# Patient Record
Sex: FEMALE | Race: WHITE | NOT HISPANIC OR LATINO | Employment: OTHER | URBAN - METROPOLITAN AREA
[De-identification: names, ages, dates, MRNs, and addresses within clinical notes are randomized per-mention and may not be internally consistent; named-entity substitution may affect disease eponyms.]

---

## 2023-10-02 PROBLEM — Z91.09 ENVIRONMENTAL ALLERGIES: Status: ACTIVE | Noted: 2023-10-02

## 2023-10-02 PROBLEM — J44.9 COPD (CHRONIC OBSTRUCTIVE PULMONARY DISEASE) (MULTI): Status: ACTIVE | Noted: 2023-10-02

## 2023-10-02 RX ORDER — BUSPIRONE HYDROCHLORIDE 5 MG/1
5 TABLET ORAL DAILY
COMMUNITY

## 2023-10-02 RX ORDER — ALBUTEROL SULFATE 90 UG/1
1-2 AEROSOL, METERED RESPIRATORY (INHALATION) AS NEEDED
COMMUNITY

## 2023-10-02 RX ORDER — TIOTROPIUM BROMIDE AND OLODATEROL 3.124; 2.736 UG/1; UG/1
2 SPRAY, METERED RESPIRATORY (INHALATION) DAILY
COMMUNITY
End: 2024-04-16 | Stop reason: ALTCHOICE

## 2023-10-02 RX ORDER — ESCITALOPRAM OXALATE 20 MG/1
20 TABLET ORAL DAILY
COMMUNITY

## 2023-10-02 RX ORDER — LAMOTRIGINE 100 MG/1
100 TABLET ORAL 2 TIMES DAILY
COMMUNITY

## 2023-10-05 ENCOUNTER — APPOINTMENT (OUTPATIENT)
Dept: PHYSICAL THERAPY | Facility: CLINIC | Age: 73
End: 2023-10-05
Payer: MEDICARE

## 2023-10-05 ENCOUNTER — TELEPHONE (OUTPATIENT)
Dept: PHYSICAL THERAPY | Facility: CLINIC | Age: 73
End: 2023-10-05
Payer: MEDICARE

## 2023-10-05 NOTE — PROGRESS NOTES
Physical Therapy                 Therapy Communication Note    Patient Name: Gwendolyn Salmeron  MRN: [id number]  Today's Date: 10/5/2023     Pt arrives for balance screen upon recommendation from ***. She scored a ***/28 on short form [place] (a score of below 23 is considered at risk of falling).      [YES]  *** would benefit from PT, and pt is in agreement. Pt will contact PCP to get referral for PT, and schedule as able.     [NO]  No PT recommended at this time, we remain available should pt status change.     [HEP]  *** was given NBOS, semi tandem stance and tandem ambulation as home exercises, handouts issued and patient demonstrated with PT supervision.

## 2023-10-25 ENCOUNTER — HOSPITAL ENCOUNTER (OUTPATIENT)
Dept: RESPIRATORY THERAPY | Facility: HOSPITAL | Age: 73
Discharge: HOME | End: 2023-10-25
Payer: MEDICARE

## 2023-10-25 ENCOUNTER — LAB (OUTPATIENT)
Dept: LAB | Facility: LAB | Age: 73
End: 2023-10-25
Payer: MEDICARE

## 2023-10-25 DIAGNOSIS — Z91.09 ENVIRONMENTAL ALLERGIES: ICD-10-CM

## 2023-10-25 DIAGNOSIS — Z91.09 OTHER ALLERGY STATUS, OTHER THAN TO DRUGS AND BIOLOGICAL SUBSTANCES: Primary | ICD-10-CM

## 2023-10-25 PROCEDURE — 94060 EVALUATION OF WHEEZING: CPT

## 2023-10-25 PROCEDURE — 36415 COLL VENOUS BLD VENIPUNCTURE: CPT

## 2023-10-25 PROCEDURE — 94729 DIFFUSING CAPACITY: CPT | Performed by: INTERNAL MEDICINE

## 2023-10-25 PROCEDURE — 94060 EVALUATION OF WHEEZING: CPT | Performed by: INTERNAL MEDICINE

## 2023-10-25 PROCEDURE — 94640 AIRWAY INHALATION TREATMENT: CPT | Mod: 59

## 2023-10-25 PROCEDURE — 86003 ALLG SPEC IGE CRUDE XTRC EA: CPT

## 2023-10-25 PROCEDURE — 82785 ASSAY OF IGE: CPT

## 2023-10-25 PROCEDURE — 94726 PLETHYSMOGRAPHY LUNG VOLUMES: CPT | Performed by: INTERNAL MEDICINE

## 2023-10-26 LAB
A ALTERNATA IGE QN: <0.1 KU/L
A FUMIGATUS IGE QN: <0.1 KU/L
BERMUDA GRASS IGE QN: <0.1 KU/L
BOXELDER IGE QN: <0.1 KU/L
C HERBARUM IGE QN: <0.1 KU/L
CALIF WALNUT POLN IGE QN: <0.1
CAT DANDER IGE QN: <0.1 KU/L
CMN PIGWEED IGE QN: <0.1 KU/L
COMMON RAGWEED IGE QN: 0.13 KU/L
COTTONWOOD IGE QN: <0.1 KU/L
D FARINAE IGE QN: <0.1 KU/L
D PTERONYSS IGE QN: <0.1 KU/L
DOG DANDER IGE QN: <0.1 KU/L
ENGL PLANTAIN IGE QN: <0.1
GOOSEFOOT IGE QN: <0.1 KU/L
JOHNSON GRASS IGE QN: <0.1 KU/L
KENT BLUE GRASS IGE QN: 1.53 KU/L
LONDON PLANE IGE QN: <0.1
MT JUNIPER IGE QN: <0.1
P NOTATUM IGE QN: <0.1 KU/L
PECAN/HICK TREE IGE QN: <0.1
ROACH IGE QN: <0.1 KU/L
SALTWORT IGE QN: <0.1 KU/L
SHEEP SORREL IGE QN: <0.1 KU/L
SILVER BIRCH IGE QN: <0.1 KU/L
TIMOTHY IGE QN: 1.53 KU/L
TOTAL IGE SMQN RAST: 10 KU/L
WHITE ASH IGE QN: <0.1 KU/L
WHITE ELM IGE QN: <0.1 KU/L
WHITE MULBERRY IGE QN: <0.1
WHITE OAK IGE QN: <0.1 KU/L

## 2023-11-03 ENCOUNTER — APPOINTMENT (OUTPATIENT)
Dept: PULMONOLOGY | Facility: CLINIC | Age: 73
End: 2023-11-03
Payer: MEDICARE

## 2023-11-10 ENCOUNTER — TELEPHONE (OUTPATIENT)
Dept: PHYSICAL THERAPY | Facility: CLINIC | Age: 73
End: 2023-11-10
Payer: MEDICARE

## 2023-11-10 ENCOUNTER — APPOINTMENT (OUTPATIENT)
Dept: PHYSICAL THERAPY | Facility: CLINIC | Age: 73
End: 2023-11-10
Payer: MEDICARE

## 2023-11-10 NOTE — PROGRESS NOTES
Physical Therapy              Balance Screen    Patient arrives to clinic for balance screen after attending a falls clinic during cardiac rehab. The patient verbalizes that she has had at least 5 falls in the past 6 months, although a majority are d/t falling asleep at the counter and subsequently falling off stool or mechanical in nature vs physiologic LOB. Patient uses a str cane for some mobility d/t R hip giving out and her fear of falling. Score on the Short-Form MEAD was 28/28 which is well above the fall risk cut-off (>23/28). PT discussed results of screen w/ patient including high score, ability to perform advanced balance challenges w/ minimal sway, and lack of skilled assistance needed indicating that there are no specific identifiable PT needs at this time. Discussed that should balance worsen, she may be appropriate for skilled PT services in the future. Patient verbalized understanding. Patient was given home exercises of single-leg stance on foam and single-leg stance on flat ground w/ horizontal and vertical head nods to improve vestibular and somatosensory balance. Handouts issued and patient demonstrated exercises w/ PT supervision.

## 2023-11-27 ENCOUNTER — CLINICAL SUPPORT (OUTPATIENT)
Dept: CARDIAC REHAB | Facility: CLINIC | Age: 73
End: 2023-11-27

## 2023-11-27 PROCEDURE — 9430000001 HC PHASE III CARDIAC REHAB MONTHLY FEE

## 2023-12-01 NOTE — PROGRESS NOTES
Patient has attended Phase III Pulmonary Rehabilitation at least one time this month. A bill for $55 will be sent out at the end of the month. Thank you for participating in our phase III program.

## 2024-01-05 ENCOUNTER — TELEPHONE (OUTPATIENT)
Dept: PULMONOLOGY | Facility: HOSPITAL | Age: 74
End: 2024-01-05
Payer: COMMERCIAL

## 2024-01-05 NOTE — TELEPHONE ENCOUNTER
Patient has been sick with chest congestion. Coughing, not coughing anything up. Very tight. No fever. Can you call in a prescription? Drug [place] [place].

## 2024-01-06 DIAGNOSIS — J44.1 CHRONIC OBSTRUCTIVE PULMONARY DISEASE WITH ACUTE EXACERBATION (MULTI): Primary | ICD-10-CM

## 2024-01-06 RX ORDER — PREDNISONE 10 MG/1
TABLET ORAL
Qty: 31 TABLET | Refills: 0 | Status: SHIPPED | OUTPATIENT
Start: 2024-01-06 | End: 2024-02-05

## 2024-01-11 ENCOUNTER — APPOINTMENT (OUTPATIENT)
Dept: PULMONOLOGY | Facility: CLINIC | Age: 74
End: 2024-01-11
Payer: COMMERCIAL

## 2024-02-07 ENCOUNTER — CLINICAL SUPPORT (OUTPATIENT)
Dept: CARDIAC REHAB | Facility: CLINIC | Age: 74
End: 2024-02-07

## 2024-02-07 PROCEDURE — 9430000001 HC PHASE III CARDIAC REHAB MONTHLY FEE

## 2024-02-20 ENCOUNTER — OFFICE VISIT (OUTPATIENT)
Dept: PULMONOLOGY | Facility: CLINIC | Age: 74
End: 2024-02-20
Payer: COMMERCIAL

## 2024-02-20 ENCOUNTER — APPOINTMENT (OUTPATIENT)
Dept: RADIOLOGY | Facility: HOSPITAL | Age: 74
End: 2024-02-20
Payer: MEDICARE

## 2024-02-20 ENCOUNTER — TELEPHONE (OUTPATIENT)
Dept: PULMONOLOGY | Facility: CLINIC | Age: 74
End: 2024-02-20

## 2024-02-20 VITALS
RESPIRATION RATE: 16 BRPM | BODY MASS INDEX: 21.51 KG/M2 | WEIGHT: 121.4 LBS | DIASTOLIC BLOOD PRESSURE: 64 MMHG | TEMPERATURE: 98.2 F | SYSTOLIC BLOOD PRESSURE: 123 MMHG | OXYGEN SATURATION: 95 % | HEART RATE: 73 BPM | HEIGHT: 63 IN

## 2024-02-20 DIAGNOSIS — J98.4 DIFFUSE IDIOPATHIC PULMONARY NEUROENDOCRINE CELL HYPERPLASIA: Primary | ICD-10-CM

## 2024-02-20 DIAGNOSIS — J44.9 CHRONIC OBSTRUCTIVE PULMONARY DISEASE, UNSPECIFIED COPD TYPE (MULTI): ICD-10-CM

## 2024-02-20 DIAGNOSIS — R60.0 EDEMA, LOWER EXTREMITY: ICD-10-CM

## 2024-02-20 PROCEDURE — 1159F MED LIST DOCD IN RCRD: CPT | Performed by: INTERNAL MEDICINE

## 2024-02-20 PROCEDURE — 99214 OFFICE O/P EST MOD 30 MIN: CPT | Performed by: INTERNAL MEDICINE

## 2024-02-20 PROCEDURE — 1157F ADVNC CARE PLAN IN RCRD: CPT | Performed by: INTERNAL MEDICINE

## 2024-02-20 PROCEDURE — 1036F TOBACCO NON-USER: CPT | Performed by: INTERNAL MEDICINE

## 2024-02-20 ASSESSMENT — ENCOUNTER SYMPTOMS
LOSS OF SENSATION IN FEET: 0
OCCASIONAL FEELINGS OF UNSTEADINESS: 0
DEPRESSION: 1

## 2024-02-20 ASSESSMENT — PATIENT HEALTH QUESTIONNAIRE - PHQ9
1. LITTLE INTEREST OR PLEASURE IN DOING THINGS: NOT AT ALL
2. FEELING DOWN, DEPRESSED OR HOPELESS: NOT AT ALL
SUM OF ALL RESPONSES TO PHQ9 QUESTIONS 1 AND 2: 0

## 2024-02-20 ASSESSMENT — COLUMBIA-SUICIDE SEVERITY RATING SCALE - C-SSRS
6. HAVE YOU EVER DONE ANYTHING, STARTED TO DO ANYTHING, OR PREPARED TO DO ANYTHING TO END YOUR LIFE?: NO
2. HAVE YOU ACTUALLY HAD ANY THOUGHTS OF KILLING YOURSELF?: NO
1. IN THE PAST MONTH, HAVE YOU WISHED YOU WERE DEAD OR WISHED YOU COULD GO TO SLEEP AND NOT WAKE UP?: NO

## 2024-02-20 NOTE — PROGRESS NOTES
Department of Medicine  Division of Pulmonary, Critical Care, and Sleep Medicine  Follow-Up Visit  [facility] - [address], [address]    Physician HPI:  Mrs. 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 She is following up with oncology clinic at [facility] with surveillance CT scan every 6 months. She presented to the office today to establish care.  Gwendolyn reports intermittent chronic cough. She was diagnosed with COPD and was prescribed Stiolto. She has not had any recent history of acute COPD exacerbation. Denies exertional chest pain or wheezing. No recurrent fevers, chills or night sweats.  No high risk occupational exposures reported.  Review of systems is positive for environmental allergies especially to pollens. She also reports allergy to cats. No known history of asthma in childhood or history of recurrent sinopulmonary infections. No past history of autoimmune disease.     Follow up 2/20/2024:  Gwendolyn reports stable respiratory status since last visit.  She has not been using Stiolto lately.  Reports cough mainly in the morning with no increased sputum production.  Also reports shortness of breath with moderate activities around the home.  No interval history of acute COPD exacerbation.  Oxygenation has been stable on room air.        Immunization History   Administered Date(s) Administered    Moderna SARS-CoV-2 Vaccination 03/31/2021    Pneumococcal conjugate vaccine, 13-valent (PREVNAR 13) 12/17/2015    Pneumococcal polysaccharide vaccine, 23-valent, age 2 years and older (PNEUMOVAX 23) 03/13/2019       Current Outpatient Medications   Medication Instructions    albuterol 90 mcg/actuation inhaler 1-2 puffs, inhalation, As needed     "busPIRone (BUSPAR) 5 mg, oral, Daily    escitalopram (LEXAPRO) 20 mg, oral, Daily    lamoTRIgine (LAMICTAL) 100 mg, oral, 2 times daily    tiotropium-olodateroL (Stiolto Respimat) 2.5-2.5 mcg/actuation mist inhaler 2 Inhalations, inhalation, Daily        Allergies:  Allergies   Allergen Reactions    Terconazole Rash and Hives     Vag Cream    Oxycodone-Acetaminophen Nausea/vomiting, GI Upset and Nausea And Vomiting    Latex Rash          Visit Vitals  /64   Pulse 73   Temp 36.8 °C (98.2 °F) (Temporal)   Resp 16   Ht 1.6 m (5' 3\")   Wt 55.1 kg (121 lb 6.4 oz)   SpO2 95%   BMI 21.51 kg/m²   Smoking Status Former   BSA 1.56 m²        Physical Exam  Constitutional: Alert and in no acute distress. Well developed, well nourished.   Ears, Nose, Mouth, and Throat: External inspection of ears and nose: Normal.   Neck: Supple. No neck mass was observed.  Pulmonary: Chest: Normal to inspection. Respiratory effort: No increased work of breathing or signs of respiratory distress. Auscultation of lungs: Clear to auscultation bilaterally.   Cardiovascular: Heart rate and rhythm were normal, normal S1 and S2, no gallops, no murmurs and no pericardial rub. Asymmetric swelling in LE ( L > R ). No calf tenderness.    Psychiatric: Judgment and insight: Intact. Alert and oriented to person, place and time. Mood and affect: Normal.      Chest Radiograph     XR CHEST 2 VIEWS 09/20/2022    Narrative  * * *Final Report* * *    DATE OF EXAM: Sep 20 2022  5:16PM    X   [id number]  -  XR CHEST 2V FRONTAL/LAT  / ACCESSION #  [id number]    PROCEDURE REASON: Hospital discharge follow-up    * * * * Physician Interpretation * * * *    EXAMINATION:  CHEST RADIOGRAPH (2 VIEW FRONTAL & LATERAL)    CLINICAL HISTORY: Hospital discharge follow-up  MQ:  XC2_6    EXAM DATE/TIME:  9/20/2022 5:16 PM    COMPARISON:  08/29/2022      RESULT:    Lines, tubes, and devices:  None.    Lungs and pleura:  Volume loss and scarring in the right hemithorax with  no " "pneumothorax visualized.  No pleural effusion or airspace process is  seen    Cardiomediastinal silhouette:  Normal cardiomediastinal silhouette.    Bones and soft tissues:  Unremarkable.    Impression  IMPRESSION:    No pneumothorax is seen                : KEEGAN  Transcribe Date/Time: Sep 21 2022 10:06A    Dictated by : ANDRE FRAZIER MD    This examination was interpreted and the report reviewed and  electronically signed by:  ANDRE FRAZIER MD on Sep 21 2022 10:06AM  EST      XR CHEST 1 VIEW 08/29/2022    Narrative  * * *Final Report* * *    DATE OF EXAM: Aug 29 2022  7:48AM    FVX   [id number]  -  XR CHEST 1V FRONTAL PORT  / ACCESSION #  [id number]    PROCEDURE REASON: Evaluate tube, line or lead position    * * * * Physician Interpretation * * * *    EXAMINATION:  CHEST RADIOGRAPH (PORTABLE SINGLE VIEW AP)    Exam Date/Time:  8/29/2022 7:48 AM  CLINICAL HISTORY: Evaluate tube, line or lead position  MQ:  XCPR_5  Comparison:  8/28/2022    RESULT:    Lines, tubes, and devices:  Stable RIGHT chest tube.    Lungs and pleura:  Small stable loculated RIGHT apical pneumothorax  measuring 1.3 at medial lung apex.  Stable right perihilar opacity.    Cardiomediastinal silhouette:  Stable cardiomediastinal silhouette.    Other:  Right chest wall soft tissue emphysema.    Impression  IMPRESSION:    Small stable RIGHT apical pneumothorax with RIGHT chest tube in place.                    : KEEGAN  Transcribe Date/Time: Aug 29 2022  8:06A    Dictated by : JAXON LOMBARDO MD    This examination was interpreted and the report reviewed and  electronically signed by:  JAXON LOMBARDO MD on Aug 29 2022  8:09AM  EST      Echocardiogram     No results found for this or any previous visit from the past 365 days.       Chest CT Scan     No results found for this or any previous visit from the past 1000 days.       Laboratory Studies     No results found for: \"WBC\", \"HGB\", \"HCT\", \"MCV\", \"PLT\"   No results found " "for: \"GLUCOSE\", \"CALCIUM\", \"NA\", \"K\", \"CO2\", \"CL\", \"BUN\", \"CREATININE\"   No results found for: \"ALT\", \"AST\", \"GGT\", \"ALKPHOS\", \"BILITOT\"     No results found for: \"PROTIME\", \"INR\", \"PTT\"    Immunocap IgE   Date/Time Value Ref Range Status   10/25/2023 02:06 PM 10.00 <=214 KU/L Final     Comment:     Note: Omalizumab (Xolair, GenePeach; humanized  IgG1 antihuman IgE Fc) treatment does not  significantly interfere with the accuracy of  total IgE on the ImmunoCAP (Maps InDeed) platform.  J Allergy Clin Immunol 2006;117:759-66).  Allergens, parasitic diseases, smoking, and  alcohol consumption have been reported to  increase levels of total IgE in serum.     Aspergillus Fumigatus IgE   Date/Time Value Ref Range Status   10/25/2023 02:06 PM <0.10 <0.10 kU/L Final         Pulmonary Function Test               Assessment and Plan / Recommendations     1. Lung adenocarcinoma IA s/p RUL lobectomy  2. DIPNECH: I reviewed the available records from [facility] which revealed numerous nodules bilaterally on CT scan that have been stable over the past year. Wedge resection pathology revealed typical carcinoid.      3. COPD GOLD spirometry III  4. Environmental allergies.     Plan:  -- Repeat CT scan of chest w/o Contrast to re-evaluate lung nodules  -- Stop LABA/LAMA and start low dose ICS/LABA/LAMA - samples of Trelegy 100 mcg provided today.  -- US duplex of left lower extremity for asymmetric edema  -- F/U after CT scan is done in 2-3 weeks.      Please excuse any misspellings or unintended errors related to the Dragon speech recognition software used to dictate this note.           Nelli Dawn MD  02/20/2024  "

## 2024-02-20 NOTE — TELEPHONE ENCOUNTER
Patient has an appointment scheduled with oncology on 4/22/2024 at [facility]. Wanted to let you know. Does she need to have another one done with you? [phone]

## 2024-02-21 ENCOUNTER — HOSPITAL ENCOUNTER (OUTPATIENT)
Dept: CARDIOLOGY | Facility: HOSPITAL | Age: 74
Discharge: HOME | End: 2024-02-21
Payer: COMMERCIAL

## 2024-02-21 DIAGNOSIS — M79.89 OTHER SPECIFIED SOFT TISSUE DISORDERS: ICD-10-CM

## 2024-02-21 DIAGNOSIS — R60.0 EDEMA, LOWER EXTREMITY: ICD-10-CM

## 2024-02-21 PROCEDURE — 93971 EXTREMITY STUDY: CPT

## 2024-02-21 PROCEDURE — 93971 EXTREMITY STUDY: CPT | Performed by: SURGERY

## 2024-03-01 NOTE — PROGRESS NOTES
Patient has attended Phase III Pulmonary Rehabilitation at least one time this month (February 2024). A bill for $55 will be sent out at the end of the month. Thank you for participating in our phase III program.     No

## 2024-03-04 ENCOUNTER — CLINICAL SUPPORT (OUTPATIENT)
Dept: CARDIAC REHAB | Facility: CLINIC | Age: 74
End: 2024-03-04

## 2024-03-04 PROCEDURE — 9430000001 HC PHASE III CARDIAC REHAB MONTHLY FEE

## 2024-03-05 ENCOUNTER — APPOINTMENT (OUTPATIENT)
Dept: RADIOLOGY | Facility: HOSPITAL | Age: 74
End: 2024-03-05
Payer: COMMERCIAL

## 2024-03-12 ENCOUNTER — APPOINTMENT (OUTPATIENT)
Dept: PULMONOLOGY | Facility: CLINIC | Age: 74
End: 2024-03-12
Payer: COMMERCIAL

## 2024-03-31 NOTE — PROGRESS NOTES
Patient has attended Phase III Pulmonary Rehabilitation at least one time this month (March 2024). A bill for $55 will be sent out at the end of the month. Thank you for participating in our phase III program.

## 2024-04-01 ENCOUNTER — CLINICAL SUPPORT (OUTPATIENT)
Dept: CARDIAC REHAB | Facility: CLINIC | Age: 74
End: 2024-04-01

## 2024-04-01 PROCEDURE — 9430000001 HC PHASE III CARDIAC REHAB MONTHLY FEE

## 2024-04-12 ENCOUNTER — TELEPHONE (OUTPATIENT)
Dept: PULMONOLOGY | Facility: CLINIC | Age: 74
End: 2024-04-12
Payer: COMMERCIAL

## 2024-04-12 NOTE — TELEPHONE ENCOUNTER
Patient called into the office.    She was previously given a sample of Trellegy 100 mcg.    She states it is working well and would like Rx sent into the [facility]  in [place]  ([phone])    Please advise

## 2024-04-16 DIAGNOSIS — J44.9 CHRONIC OBSTRUCTIVE PULMONARY DISEASE, UNSPECIFIED COPD TYPE (MULTI): Primary | ICD-10-CM

## 2024-04-16 RX ORDER — FLUTICASONE FUROATE, UMECLIDINIUM BROMIDE AND VILANTEROL TRIFENATATE 100; 62.5; 25 UG/1; UG/1; UG/1
1 POWDER RESPIRATORY (INHALATION) DAILY
Qty: 30 EACH | Refills: 11 | Status: SHIPPED | OUTPATIENT
Start: 2024-04-16 | End: 2025-04-16

## 2024-04-16 NOTE — TELEPHONE ENCOUNTER
Patient came in and I gave 1 Sample of Trelegy 100. She needs a prescription sent in to her local pharmacy.

## 2024-05-01 NOTE — PROGRESS NOTES
Patient has attended Phase III Pulmonary Rehabilitation at least one time this month (April 2024). A bill for $55 will be sent out at the end of the month. Thank you for participating in our phase III program.

## 2024-05-02 ENCOUNTER — CLINICAL SUPPORT (OUTPATIENT)
Dept: CARDIAC REHAB | Facility: CLINIC | Age: 74
End: 2024-05-02

## 2024-05-02 PROCEDURE — 9430000001 HC PHASE III CARDIAC REHAB MONTHLY FEE

## 2024-05-31 NOTE — PROGRESS NOTES
Patient has attended Phase III Pulmonary Rehabilitation at least one time this month (May 2024). A bill for $55 will be sent out at the end of the month. Thank you for participating in our phase III program.

## 2024-06-04 ENCOUNTER — CLINICAL SUPPORT (OUTPATIENT)
Dept: CARDIAC REHAB | Facility: CLINIC | Age: 74
End: 2024-06-04

## 2024-06-04 PROCEDURE — 9430000001 HC PHASE III CARDIAC REHAB MONTHLY FEE

## 2024-06-30 NOTE — PROGRESS NOTES
Patient has attended Phase III Pulmonary Rehabilitation at least one time this month (June 2024). A bill for $55 will be sent out at the end of the month. Thank you for participating in our phase III program.

## 2024-07-01 ENCOUNTER — CLINICAL SUPPORT (OUTPATIENT)
Dept: CARDIAC REHAB | Facility: CLINIC | Age: 74
End: 2024-07-01

## 2024-07-01 PROCEDURE — 9430000001 HC PHASE III CARDIAC REHAB MONTHLY FEE

## 2024-07-29 NOTE — PROGRESS NOTES
Patient has attended Phase III Pulmonary Rehabilitation at least one time this month (July 2024). A bill for $55 will be sent out at the end of the month. Thank you for participating in our phase III program.

## 2024-08-01 ENCOUNTER — CLINICAL SUPPORT (OUTPATIENT)
Dept: CARDIAC REHAB | Facility: CLINIC | Age: 74
End: 2024-08-01

## 2024-08-01 PROCEDURE — 9430000001 HC PHASE III CARDIAC REHAB MONTHLY FEE

## 2024-09-01 NOTE — PROGRESS NOTES
Patient has attended Phase III Pulmonary Rehabilitation at least one time this month (August 2024). A bill for $55 will be sent out at the end of the month. Thank you for participating in our phase III program.

## 2024-09-03 ENCOUNTER — CLINICAL SUPPORT (OUTPATIENT)
Dept: CARDIAC REHAB | Facility: CLINIC | Age: 74
End: 2024-09-03

## 2024-09-03 PROCEDURE — 9430000001 HC PHASE III CARDIAC REHAB MONTHLY FEE

## 2024-09-13 ENCOUNTER — TELEPHONE (OUTPATIENT)
Dept: PULMONOLOGY | Facility: CLINIC | Age: 74
End: 2024-09-13
Payer: COMMERCIAL

## 2024-09-13 DIAGNOSIS — J44.9 CHRONIC OBSTRUCTIVE PULMONARY DISEASE, UNSPECIFIED COPD TYPE (MULTI): ICD-10-CM

## 2024-09-13 NOTE — TELEPHONE ENCOUNTER
Pt called and said that you gave her some samples of the trelegy ellipta 200mg at the last doctors visit.    You put in a med order for the trelegy ellipta 100mg and pt is asking if she can take the 200 mg and 100 mg.     Please advise

## 2024-09-17 DIAGNOSIS — J44.9 CHRONIC OBSTRUCTIVE PULMONARY DISEASE, UNSPECIFIED COPD TYPE (MULTI): Primary | ICD-10-CM

## 2024-09-17 RX ORDER — FLUTICASONE FUROATE, UMECLIDINIUM BROMIDE AND VILANTEROL TRIFENATATE 100; 62.5; 25 UG/1; UG/1; UG/1
1 POWDER RESPIRATORY (INHALATION) DAILY
Qty: 30 EACH | Refills: 11 | Status: SHIPPED | OUTPATIENT
Start: 2024-09-17 | End: 2025-09-17

## 2024-09-17 RX ORDER — FLUTICASONE FUROATE, UMECLIDINIUM BROMIDE AND VILANTEROL TRIFENATATE 100; 62.5; 25 UG/1; UG/1; UG/1
1 POWDER RESPIRATORY (INHALATION) DAILY
Qty: 30 EACH | Refills: 11 | Status: SHIPPED
Start: 2024-09-17 | End: 2024-09-17 | Stop reason: SDUPTHER

## 2024-09-17 NOTE — TELEPHONE ENCOUNTER
Pt called again needs her trelegy ellipta filled and wants to know if she can take the 100 or 200 mcg please let me know so I can call the pt.    Please advise

## 2024-09-30 NOTE — PROGRESS NOTES
Patient has attended Phase III Pulmonary Rehabilitation at least one time this month (September 2024). A bill for $55 will be sent out at the end of the month. Thank you for participating in our phase III program.

## 2024-10-01 ENCOUNTER — CLINICAL SUPPORT (OUTPATIENT)
Dept: CARDIAC REHAB | Facility: CLINIC | Age: 74
End: 2024-10-01

## 2024-10-01 PROCEDURE — 9430000001 HC PHASE III CARDIAC REHAB MONTHLY FEE

## 2025-01-21 ENCOUNTER — APPOINTMENT (OUTPATIENT)
Dept: PULMONOLOGY | Facility: CLINIC | Age: 75
End: 2025-01-21
Payer: COMMERCIAL

## 2025-02-26 ENCOUNTER — APPOINTMENT (OUTPATIENT)
Facility: CLINIC | Age: 75
End: 2025-02-26
Payer: COMMERCIAL

## 2025-03-04 ENCOUNTER — CLINICAL SUPPORT (OUTPATIENT)
Dept: CARDIAC REHAB | Facility: CLINIC | Age: 75
End: 2025-03-04

## 2025-03-04 PROCEDURE — 9430000001 HC PHASE III CARDIAC REHAB MONTHLY FEE

## 2025-03-27 NOTE — PROGRESS NOTES
Patient has attended Phase III Pulmonary Rehabilitation at least one time this month (March 2025). A bill for $55 will be sent out at the end of the month. Thank you for participating in our phase III program.

## 2025-04-01 ENCOUNTER — CLINICAL SUPPORT (OUTPATIENT)
Dept: CARDIAC REHAB | Facility: CLINIC | Age: 75
End: 2025-04-01

## 2025-04-01 PROCEDURE — 9430000001 HC PHASE III CARDIAC REHAB MONTHLY FEE

## 2025-04-29 NOTE — PROGRESS NOTES
Patient has attended Phase III Pulmonary Rehabilitation at least one time this month (April 2025). A bill for $55 will be sent out at the end of the month. Thank you for participating in our phase III program.

## 2025-05-01 ENCOUNTER — CLINICAL SUPPORT (OUTPATIENT)
Dept: CARDIAC REHAB | Facility: CLINIC | Age: 75
End: 2025-05-01

## 2025-05-01 ENCOUNTER — APPOINTMENT (OUTPATIENT)
Facility: CLINIC | Age: 75
End: 2025-05-01
Payer: COMMERCIAL

## 2025-05-01 VITALS
SYSTOLIC BLOOD PRESSURE: 136 MMHG | DIASTOLIC BLOOD PRESSURE: 78 MMHG | HEIGHT: 63 IN | OXYGEN SATURATION: 98 % | BODY MASS INDEX: 21.44 KG/M2 | WEIGHT: 121 LBS | TEMPERATURE: 97.7 F | HEART RATE: 89 BPM

## 2025-05-01 DIAGNOSIS — C34.2 MALIGNANT NEOPLASM OF MIDDLE LOBE OF RIGHT LUNG (MULTI): Primary | ICD-10-CM

## 2025-05-01 DIAGNOSIS — J98.4 DIFFUSE IDIOPATHIC PULMONARY NEUROENDOCRINE CELL HYPERPLASIA: ICD-10-CM

## 2025-05-01 DIAGNOSIS — J44.9 CHRONIC OBSTRUCTIVE PULMONARY DISEASE, UNSPECIFIED COPD TYPE (MULTI): ICD-10-CM

## 2025-05-01 PROCEDURE — 9430000001 HC PHASE III CARDIAC REHAB MONTHLY FEE

## 2025-05-01 PROCEDURE — 1157F ADVNC CARE PLAN IN RCRD: CPT | Performed by: INTERNAL MEDICINE

## 2025-05-01 PROCEDURE — 1125F AMNT PAIN NOTED PAIN PRSNT: CPT | Performed by: INTERNAL MEDICINE

## 2025-05-01 PROCEDURE — G2211 COMPLEX E/M VISIT ADD ON: HCPCS | Performed by: INTERNAL MEDICINE

## 2025-05-01 PROCEDURE — 1159F MED LIST DOCD IN RCRD: CPT | Performed by: INTERNAL MEDICINE

## 2025-05-01 PROCEDURE — 99214 OFFICE O/P EST MOD 30 MIN: CPT | Performed by: INTERNAL MEDICINE

## 2025-05-01 RX ORDER — ALBUTEROL SULFATE 90 UG/1
1 INHALANT RESPIRATORY (INHALATION) ONCE
OUTPATIENT
Start: 2025-05-01

## 2025-05-01 RX ORDER — ALBUTEROL SULFATE 0.83 MG/ML
3 SOLUTION RESPIRATORY (INHALATION) ONCE
OUTPATIENT
Start: 2025-05-01 | End: 2025-05-01

## 2025-05-01 ASSESSMENT — PAIN SCALES - GENERAL: PAINLEVEL_OUTOF10: 3

## 2025-05-01 ASSESSMENT — PATIENT HEALTH QUESTIONNAIRE - PHQ9
SUM OF ALL RESPONSES TO PHQ9 QUESTIONS 1 AND 2: 0
1. LITTLE INTEREST OR PLEASURE IN DOING THINGS: NOT AT ALL
2. FEELING DOWN, DEPRESSED OR HOPELESS: NOT AT ALL

## 2025-05-28 NOTE — PROGRESS NOTES
Department of Medicine  Division of Pulmonary, Critical Care, and Sleep Medicine  Follow-Up Visit  [facility] - [address], [address]    Physician HPI:  Mrs. 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 She is following up with oncology clinic at [facility] with surveillance CT scan every 6 months. She presented to the office today to establish care.  Gwendolyn reports intermittent chronic cough. She was diagnosed with COPD and was prescribed Stiolto. She has not had any recent history of acute COPD exacerbation. Denies exertional chest pain or wheezing. No recurrent fevers, chills or night sweats.  No high risk occupational exposures reported.  Review of systems is positive for environmental allergies especially to pollens. She also reports allergy to cats. No known history of asthma in childhood or history of recurrent sinopulmonary infections. No past history of autoimmune disease.     Follow up 2/20/2024:  Gwendolyn reports stable respiratory status since last visit.  She has not been using Stiolto lately.  Reports cough mainly in the morning with no increased sputum production.  Also reports shortness of breath with moderate activities around the home.  No interval history of acute COPD exacerbation.  Oxygenation has been stable on room air.    Follow up 05/01/2025:  Events since last visit are reviewed today.  Surveillance imaging in October 2024 revealed increased size of the masslike soft tissue opacity along the suture in the right middle lobe. PET/CT in November showed increased metabolic activity.  Underwent bronchoscopy and later on CT-guided biopsy which revealed invasive adenocarcinoma.  No evidence of metastatic disease on PET/CT and brain  "MRI. Underwent SBRT by radiation oncology in February 2025. She is scheduled for surveillance CT scan of chest in 3 months by Oncology. She reports to feel fatigue with chronic dyspnea on exertion.  Cough is intermittent.  No purulent sputum production.  She is using Trelegy every day.  Did not need to use albuterol often.  Oxygenation has been stable.      Immunization History   Administered Date(s) Administered    Moderna SARS-CoV-2 Vaccination 03/31/2021    Pneumococcal conjugate vaccine, 13-valent (PREVNAR 13) 12/17/2015    Pneumococcal polysaccharide vaccine, 23-valent, age 2 years and older (PNEUMOVAX 23) 03/13/2019       Current Outpatient Medications   Medication Instructions    albuterol 90 mcg/actuation inhaler 1-2 puffs, As needed    busPIRone (BUSPAR) 5 mg, Daily    escitalopram (LEXAPRO) 20 mg, Daily    fluticasone-umeclidin-vilanter (Trelegy Ellipta) 100-62.5-25 mcg blister with device 1 puff, inhalation, Daily    lamoTRIgine (LAMICTAL) 100 mg, 2 times daily        Allergies:  Allergies   Allergen Reactions    Terconazole Rash and Hives     Vag Cream    Oxycodone-Acetaminophen Nausea/vomiting, GI Upset and Nausea And Vomiting    Latex Rash          Visit Vitals  /78   Pulse 89   Temp 36.5 °C (97.7 °F) (Temporal)   Ht 1.6 m (5' 3\")   Wt 54.9 kg (121 lb)   SpO2 98%   BMI 21.43 kg/m²   Smoking Status Former   BSA 1.56 m²        Physical Exam  Constitutional: Alert and in no acute distress. Well developed, well nourished.   Ears, Nose, Mouth, and Throat: External inspection of ears and nose: Normal.   Neck: Supple. No neck mass was observed.  Pulmonary: Chest: Normal to inspection. Respiratory effort: No increased work of breathing or signs of respiratory distress. Auscultation of lungs: Clear to auscultation bilaterally.   Cardiovascular: Heart rate and rhythm were normal, normal S1 and S2, no gallops, no murmurs and no pericardial rub. Asymmetric swelling in LE ( L > R ). No calf tenderness.  "   Psychiatric: Judgment and insight: Intact. Alert and oriented to person, place and time. Mood and affect: Normal.      Chest Radiograph     XR CHEST 2 VIEWS 09/20/2022    Narrative  * * *Final Report* * *    DATE OF EXAM: Sep 20 2022  5:16PM    VHX   5291  -  XR CHEST 2V FRONTAL/LAT  / ACCESSION #  [id number]    PROCEDURE REASON: Hospital discharge follow-up    * * * * Physician Interpretation * * * *    EXAMINATION:  CHEST RADIOGRAPH (2 VIEW FRONTAL & LATERAL)    CLINICAL HISTORY: Hospital discharge follow-up  MQ:  XC2_6    EXAM DATE/TIME:  9/20/2022 5:16 PM    COMPARISON:  08/29/2022      RESULT:    Lines, tubes, and devices:  None.    Lungs and pleura:  Volume loss and scarring in the right hemithorax with  no pneumothorax visualized.  No pleural effusion or airspace process is  seen    Cardiomediastinal silhouette:  Normal cardiomediastinal silhouette.    Bones and soft tissues:  Unremarkable.    Impression  IMPRESSION:    No pneumothorax is seen                : [facility]  Transcribe Date/Time: Sep 21 2022 10:06A    Dictated by : ANDRE FRAZIER MD    This examination was interpreted and the report reviewed and  electronically signed by:  ANDRE FRAZIER MD on Sep 21 2022 10:06AM  EST      XR CHEST 1 VIEW 08/29/2022    Narrative  * * *Final Report* * *    DATE OF EXAM: Aug 29 2022  7:48AM    FVX   [id number]  -  XR CHEST 1V FRONTAL PORT  / ACCESSION #  [id number]    PROCEDURE REASON: Evaluate tube, line or lead position    * * * * Physician Interpretation * * * *    EXAMINATION:  CHEST RADIOGRAPH (PORTABLE SINGLE VIEW AP)    Exam Date/Time:  8/29/2022 7:48 AM  CLINICAL HISTORY: Evaluate tube, line or lead position  MQ:  XCPR_5  Comparison:  8/28/2022    RESULT:    Lines, tubes, and devices:  Stable RIGHT chest tube.    Lungs and pleura:  Small stable loculated RIGHT apical pneumothorax  measuring 1.3 at medial lung apex.  Stable right perihilar opacity.    Cardiomediastinal silhouette:  Stable cardiomediastinal  "silhouette.    Other:  Right chest wall soft tissue emphysema.    Impression  IMPRESSION:    Small stable RIGHT apical pneumothorax with RIGHT chest tube in place.                    : [facility]  Transcribe Date/Time: Aug 29 2022  8:06A    Dictated by : JAXON LOMBARDO MD    This examination was interpreted and the report reviewed and  electronically signed by:  JAXON LOMBARDO MD on Aug 29 2022  8:09AM  EST      Echocardiogram     No results found for this or any previous visit from the past 365 days.       Chest CT Scan     No results found for this or any previous visit from the past 1000 days.       Laboratory Studies     No results found for: \"WBC\", \"HGB\", \"HCT\", \"MCV\", \"PLT\"   No results found for: \"GLUCOSE\", \"CALCIUM\", \"NA\", \"K\", \"CO2\", \"CL\", \"BUN\", \"CREATININE\"   No results found for: \"ALT\", \"AST\", \"GGT\", \"ALKPHOS\", \"BILITOT\"     No results found for: \"PROTIME\", \"INR\", \"PTT\"    Immunocap IgE   Date/Time Value Ref Range Status   10/25/2023 02:06 PM 10.00 <=214 KU/L Final     Comment:     Note: Omalizumab (Xolair, GeneAMS-Qi; humanized  IgG1 antihuman IgE Fc) treatment does not  significantly interfere with the accuracy of  total IgE on the ImmunoCAP (Active Storage) platform.  J Allergy Clin Immunol 2006;117:759-66).  Allergens, parasitic diseases, smoking, and  alcohol consumption have been reported to  increase levels of total IgE in serum.     Aspergillus Fumigatus IgE   Date/Time Value Ref Range Status   10/25/2023 02:06 PM <0.10 <0.10 kU/L Final         Pulmonary Function Test               Assessment and Plan / Recommendations     1. Lung adenocarcinoma s/p RUL lobectomy. Recent recurrence along the sutures in the RML s/p SBRT  2. DIPNECH: I reviewed the available records from [facility] which revealed numerous nodules bilaterally on CT scan that have been stable over the past year. Wedge resection pathology revealed typical carcinoid.      3. COPD GOLD spirometry III  4. Environmental " allergies.     Plan:  --Surveillance CT scan of chest is scheduled by oncology  --Continue on current inhaler regimen  --Repeat pulmonary function test prior to next visit  --In the meantime, pulmonary rehab is highly recommended.  Referral is made today.     Please excuse any misspellings or unintended errors related to the Dragon speech recognition software used to dictate this note.           Nelli Dawn MD  05/01/2025

## 2025-06-03 ENCOUNTER — CLINICAL SUPPORT (OUTPATIENT)
Dept: CARDIAC REHAB | Facility: CLINIC | Age: 75
End: 2025-06-03

## 2025-06-03 PROCEDURE — 9430000001 HC PHASE III CARDIAC REHAB MONTHLY FEE

## 2025-06-10 ENCOUNTER — HOSPITAL ENCOUNTER (OUTPATIENT)
Dept: RESPIRATORY THERAPY | Facility: HOSPITAL | Age: 75
Discharge: HOME | End: 2025-06-10
Payer: MEDICARE

## 2025-06-10 DIAGNOSIS — C34.2 MALIGNANT NEOPLASM OF MIDDLE LOBE OF RIGHT LUNG (MULTI): ICD-10-CM

## 2025-06-10 PROCEDURE — 2500000002 HC RX 250 W HCPCS SELF ADMINISTERED DRUGS (ALT 637 FOR MEDICARE OP, ALT 636 FOR OP/ED): Performed by: INTERNAL MEDICINE

## 2025-06-10 PROCEDURE — 94726 PLETHYSMOGRAPHY LUNG VOLUMES: CPT

## 2025-06-10 PROCEDURE — 94729 DIFFUSING CAPACITY: CPT | Performed by: INTERNAL MEDICINE

## 2025-06-10 PROCEDURE — 94618 PULMONARY STRESS TESTING: CPT

## 2025-06-10 PROCEDURE — 94726 PLETHYSMOGRAPHY LUNG VOLUMES: CPT | Performed by: INTERNAL MEDICINE

## 2025-06-10 PROCEDURE — 94640 AIRWAY INHALATION TREATMENT: CPT

## 2025-06-10 PROCEDURE — 94060 EVALUATION OF WHEEZING: CPT | Performed by: INTERNAL MEDICINE

## 2025-06-10 RX ORDER — ALBUTEROL SULFATE 90 UG/1
1 INHALANT RESPIRATORY (INHALATION) ONCE
Status: COMPLETED | OUTPATIENT
Start: 2025-06-10 | End: 2025-06-10

## 2025-06-10 RX ORDER — ALBUTEROL SULFATE 0.83 MG/ML
3 SOLUTION RESPIRATORY (INHALATION) ONCE
Status: COMPLETED | OUTPATIENT
Start: 2025-06-10 | End: 2025-06-10

## 2025-06-10 RX ADMIN — ALBUTEROL SULFATE 3 ML: 2.5 SOLUTION RESPIRATORY (INHALATION) at 13:45

## 2025-06-11 LAB
MGC ASCENT PFT - FEV1 - POST: 0.88
MGC ASCENT PFT - FEV1 - PRE: 0.84
MGC ASCENT PFT - FEV1 - PREDICTED: 1.93
MGC ASCENT PFT - FVC - POST: 1.94
MGC ASCENT PFT - FVC - PRE: 1.89
MGC ASCENT PFT - FVC - PREDICTED: 2.5

## 2025-07-01 NOTE — PROGRESS NOTES
Patient has attended Phase III Pulmonary Rehabilitation at least one time this month (June 2025). A bill for $55 will be sent out at the end of the month. Thank you for participating in our phase III program.

## 2025-07-03 ENCOUNTER — CLINICAL SUPPORT (OUTPATIENT)
Dept: CARDIAC REHAB | Facility: CLINIC | Age: 75
End: 2025-07-03

## 2025-07-03 PROCEDURE — 9430000001 HC PHASE III CARDIAC REHAB MONTHLY FEE

## 2025-07-31 NOTE — PROGRESS NOTES
Patient has attended Phase III Pulmonary Rehabilitation at least one time this month (July 2025). A bill for $55 will be sent out at the end of the month. Thank you for participating in our phase III program.

## 2025-11-06 ENCOUNTER — APPOINTMENT (OUTPATIENT)
Facility: CLINIC | Age: 75
End: 2025-11-06
Payer: MEDICARE